# Patient Record
(demographics unavailable — no encounter records)

---

## 2024-11-22 NOTE — HEALTH RISK ASSESSMENT
[Good] : ~his/her~  mood as  good [Yes] : Yes [4 or more  times a week (4 pts)] : 4 or more  times a week (4 points) [1 or 2 (0 pts)] : 1 or 2 (0 points) [Never (0 pts)] : Never (0 points) [No] : In the past 12 months have you used drugs other than those required for medical reasons? No [No falls in past year] : Patient reported no falls in the past year [Never] : Never [Patient reported mammogram was normal] : Patient reported mammogram was normal [None] : None [With Family] : lives with family [Retired] : retired [Feels Safe at Home] : Feels safe at home [Fully functional (bathing, dressing, toileting, transferring, walking, feeding)] : Fully functional (bathing, dressing, toileting, transferring, walking, feeding) [Fully functional (using the telephone, shopping, preparing meals, housekeeping, doing laundry, using] : Fully functional and needs no help or supervision to perform IADLs (using the telephone, shopping, preparing meals, housekeeping, doing laundry, using transportation, managing medications and managing finances) [Smoke Detector] : smoke detector [Carbon Monoxide Detector] : carbon monoxide detector [Seat Belt] :  uses seat belt [de-identified] : active [de-identified] : regular [Reports changes in hearing] : Reports no changes in hearing [Reports changes in vision] : Reports no changes in vision [Reports changes in dental health] : Reports no changes in dental health [MammogramDate] : 7/22

## 2024-11-22 NOTE — ASSESSMENT
[FreeTextEntry1] : HCM  Labs discussed with pt today  Gyn, mammogram utd  Colonoscopy 12/21 with Dr Baig  Flu, tdap, prevnar utd  continue current med  BP Check in 6 months.

## 2024-11-22 NOTE — HISTORY OF PRESENT ILLNESS
[FreeTextEntry1] : Annual Physical [de-identified] : TOOTIE BALDWIN is a 78 yo woman with hypertension, right breast cancer s/p mastectomy 12/2001, mitral regurgitation here for a physical.  She has been well overall.  Sees cardiologist Dr Flower regularly, scheduled soon.  Opthalm utd Dr Barajas for glaucoma.  Gyn utd with dr oswald.  Left Mammogram ut[place] 7/2024 [place] [facility] [place].  Colonoscopy  12/21 with Dr Baig.  Had negative work up for microscopic hematuria 2021, sees urologist yearly.  Shingrix #1 in 4/2019 caused hives. Did not have shingrix #2.  Had flu shot and covid booster.  The patient is  with 2 children and 4 grandchildren. She is a retired .  She would have no difficulty walking 4 to 6 blocks or 2 flights of stairs.

## 2024-11-22 NOTE — PHYSICAL EXAM
[No Acute Distress] : no acute distress [Well Nourished] : well nourished [Well Developed] : well developed [Well-Appearing] : well-appearing [Normal Sclera/Conjunctiva] : normal sclera/conjunctiva [EOMI] : extraocular movements intact [Normal Outer Ear/Nose] : the outer ears and nose were normal in appearance [Normal Oropharynx] : the oropharynx was normal [Normal TMs] : both tympanic membranes were normal [Normal Nasal Mucosa] : the nasal mucosa was normal [No Lymphadenopathy] : no lymphadenopathy [Supple] : supple [Thyroid Normal, No Nodules] : the thyroid was normal and there were no nodules present [No Respiratory Distress] : no respiratory distress  [No Accessory Muscle Use] : no accessory muscle use [Clear to Auscultation] : lungs were clear to auscultation bilaterally [Normal Rate] : normal rate  [Regular Rhythm] : with a regular rhythm [Normal S1, S2] : normal S1 and S2 [No Edema] : there was no peripheral edema [Soft] : abdomen soft [Non Tender] : non-tender [Non-distended] : non-distended [No Masses] : no abdominal mass palpated [Normal Bowel Sounds] : normal bowel sounds [No CVA Tenderness] : no CVA  tenderness [Coordination Grossly Intact] : coordination grossly intact [No Focal Deficits] : no focal deficits [Normal Gait] : normal gait [Deep Tendon Reflexes (DTR)] : deep tendon reflexes were 2+ and symmetric [Speech Grossly Normal] : speech grossly normal [Memory Grossly Normal] : memory grossly normal [Normal Affect] : the affect was normal [Alert and Oriented x3] : oriented to person, place, and time [Normal Mood] : the mood was normal [Normal Insight/Judgement] : insight and judgment were intact [de-identified] : systolic murmur [de-identified] : s/p right mastectomy.  Left breast with no masses, no LAD.

## 2025-04-10 NOTE — PHYSICAL EXAM
[General Appearance - Alert] : alert [General Appearance - In No Acute Distress] : in no acute distress [Sclera] : the sclera and conjunctiva were normal [PERRL With Normal Accommodation] : pupils were equal in size, round, and reactive to light [Extraocular Movements] : extraocular movements were intact [Outer Ear] : the ears and nose were normal in appearance [Oropharynx] : the oropharynx was normal [Auscultation Breath Sounds / Voice Sounds] : lungs were clear to auscultation bilaterally [Heart Rate And Rhythm] : heart rate was normal and rhythm regular [Heart Sounds] : normal S1 and S2 [Heart Sounds Gallop] : no gallops [Murmurs] : no murmurs [Heart Sounds Pericardial Friction Rub] : no pericardial rub [Bowel Sounds] : normal bowel sounds [Abdomen Soft] : soft [Abdomen Tenderness] : non-tender [Abdomen Mass (___ Cm)] : no abdominal mass palpated [Abnormal Walk] : normal gait [Nail Clubbing] : no clubbing  or cyanosis of the fingernails [Musculoskeletal - Swelling] : no joint swelling seen [Motor Tone] : muscle strength and tone were normal [Skin Color & Pigmentation] : normal skin color and pigmentation [Skin Turgor] : normal skin turgor [] : no rash [Deep Tendon Reflexes (DTR)] : deep tendon reflexes were 2+ and symmetric [Sensation] : the sensory exam was normal to light touch and pinprick [No Focal Deficits] : no focal deficits [Oriented To Time, Place, And Person] : oriented to person, place, and time [Impaired Insight] : insight and judgment were intact [Affect] : the affect was normal [FreeTextEntry1] : crepitus in both knees, ROM slightly decreased. Active ROM slightly limited in right shoulder

## 2025-04-10 NOTE — ASSESSMENT
[FreeTextEntry1] : 79 yo F with history of OA, HTN, breast cancer (1998, s/p mastectomy)  presented to establish care for joint pain in knees and shoulders   1. Chronic joint pain in shoulders and knees: Likely OA given the absence of inflammatory signs such as morning stiffness, swelling, or systemic symptoms. Pain has been gradually progressive over 10 years, with crepitus and mild ROM limitations noted on exam. Symptoms improve with occasional Tylenol use. No current evidence of inflammatory arthritis. F/up with Sport medicine, recently had steroid injection to right shoulder   2. 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 Albarran; no systemic autoimmune disease. No current symptoms suggestive of connective tissue disease (e.g., no sicca symptoms, Raynaud's, rash, or systemic complaints).  PLAN:  1. PT for knees and shoulders 2. Voltaren gel/Lidocaine as needed 3. Discussed steroid/gel injection optrions, patient deferred at this time  D/w Dr. Wolf Medina MD Rheumatology Fellow PGY-5

## 2025-04-10 NOTE — HISTORY OF PRESENT ILLNESS
[None] : The patient is currently asymptomatic [FreeTextEntry1] : 79 yo F with history of OA, HTN, breast cancer (1998, s/p mastectomy)  presented to establish care for joint pain in knees and shoulders  She reports chronic pain in her shoulders and knees, which began approximately 10 years ago and has gradually worsened. She rates the pain as 6-7 out of 10. The pain typically occurs later in the day and is not associated with morning stiffness. She takes Tylenol 1-2 times per week, which provides significant relief. She denies fever, rashes, sicca symptoms, Raynaud's phenomenon, chest pain, shortness of breath, abdominal pain, or diarrhea.  She was evaluated by Dr. Albarran in 2021 for a lichenoid drug reaction and a weakly positive GINA. At that time, she was informed that she did not have an autoimmune disease.  DEXA scan done ~ 2 years ago, was told she has osteopenia, takes Vit D3 daily

## 2025-05-08 NOTE — PHYSICAL EXAM
[No Acute Distress] : no acute distress [Well Nourished] : well nourished [Well Developed] : well developed [Well-Appearing] : well-appearing [No Lymphadenopathy] : no lymphadenopathy [Supple] : supple [No Respiratory Distress] : no respiratory distress  [No Accessory Muscle Use] : no accessory muscle use [Clear to Auscultation] : lungs were clear to auscultation bilaterally [Normal Rate] : normal rate  [Regular Rhythm] : with a regular rhythm [Normal S1, S2] : normal S1 and S2 [No Edema] : there was no peripheral edema [Normal Gait] : normal gait [Alert and Oriented x3] : oriented to person, place, and time [de-identified] : JANNET

## 2025-05-08 NOTE — HISTORY OF PRESENT ILLNESS
[FreeTextEntry1] : fuv [de-identified] : TOOTIE BALDWIN is a 77 yo woman with hypertension, right breast cancer s/p mastectomy 12/2001, moderate mitral regurgitation here for a bp check.  She has been well overall.  Sees cardiologist Dr Flower regularly.  Opthalm utd Dr Barajas for glaucoma.    Gyn utd with dr oswald.  Left mammogram utd 7/2024 at [facility] [place] [facility], scheduled later this year.  Colonoscopy  12/21 utd Dr Baig.  The patient is  with 2 children and 4 grandchildren. She is a retired .  She would have no difficulty walking 4 to 6 blocks or 2 flights of stairs.

## 2025-05-08 NOTE — ASSESSMENT
[FreeTextEntry1] : Labs drawn in office today will consider P 20 Tdap utd continue current meds CPX in 6 months